# Patient Record
Sex: MALE | NOT HISPANIC OR LATINO | URBAN - METROPOLITAN AREA
[De-identification: names, ages, dates, MRNs, and addresses within clinical notes are randomized per-mention and may not be internally consistent; named-entity substitution may affect disease eponyms.]

---

## 2017-07-22 ENCOUNTER — IMPORTED ENCOUNTER (OUTPATIENT)
Dept: URBAN - METROPOLITAN AREA CLINIC 1 | Facility: CLINIC | Age: 62
End: 2017-07-22

## 2017-07-22 PROBLEM — Z96.1: Noted: 2017-07-22

## 2017-07-22 PROBLEM — Z79.84: Noted: 2017-07-22

## 2017-07-22 PROBLEM — H16.143: Noted: 2017-07-22

## 2017-07-22 PROBLEM — H04.123: Noted: 2017-07-22

## 2017-07-22 PROBLEM — H43.813: Noted: 2017-07-22

## 2017-07-22 PROBLEM — E11.9: Noted: 2017-07-22

## 2017-07-22 PROCEDURE — 92014 COMPRE OPH EXAM EST PT 1/>: CPT

## 2017-07-22 NOTE — PATIENT DISCUSSION
1.  DM Type II (Oral Meds) without sign of diabetic retinopathy and no blot heme on dilated retinal examination today OU No Macular Edema:  Discussed the pathophysiology of diabetes and its effect on the eye and risk of blindness. Stressed the importance of strong glucose control. Advised of importance of at least yearly dilated examinations but to contact us immediately for any problems or concerns. 2. ROJELIO w/ PEK OU- Increase ATs to TID OU routinely. 3.  PVD OU - RD precautions. 4.  Pseudophakia OU - (Standard OU) 5. Band Keratopathy OU- observe Letter to PCP Return for an appointment in 1 yr 27 with Dr. Claudeen Cobble.

## 2018-07-21 ENCOUNTER — IMPORTED ENCOUNTER (OUTPATIENT)
Dept: URBAN - METROPOLITAN AREA CLINIC 1 | Facility: CLINIC | Age: 63
End: 2018-07-21

## 2018-07-21 PROBLEM — Z79.84: Noted: 2018-07-21

## 2018-07-21 PROBLEM — H04.123: Noted: 2018-07-21

## 2018-07-21 PROBLEM — E11.9: Noted: 2018-07-21

## 2018-07-21 PROBLEM — Z79.4: Noted: 2018-07-21

## 2018-07-21 PROBLEM — H16.143: Noted: 2018-07-21

## 2018-07-21 PROCEDURE — 92015 DETERMINE REFRACTIVE STATE: CPT

## 2018-07-21 PROCEDURE — 92014 COMPRE OPH EXAM EST PT 1/>: CPT

## 2018-07-21 NOTE — PATIENT DISCUSSION
1.  DM Type II (Oral Meds/ Insulin) without sign of diabetic retinopathy and no blot heme on dilated retinal examination today OU No Macular Edema:  Discussed the pathophysiology of diabetes and its effect on the eye and risk of blindness. Stressed the importance of strong glucose control. Advised of importance of at least yearly dilated examinations but to contact us immediately for any problems or concerns. 2. ROJELIO w/ PEK OU- Increase ATs to BID-TID OU routinely. Sample of Systane Ultra given. 3.  PVD OU - RD precautions. 4.  Pseudophakia OU - (Standard OU) 5. Band Keratopathy OU- observe Letter to [address] for an appointment in 1 yr 27 with Dr. Sd Bagley.

## 2019-07-20 ENCOUNTER — IMPORTED ENCOUNTER (OUTPATIENT)
Dept: URBAN - METROPOLITAN AREA CLINIC 1 | Facility: CLINIC | Age: 64
End: 2019-07-20

## 2019-07-20 PROBLEM — H26.492: Noted: 2019-07-20

## 2019-07-20 PROBLEM — Z79.4: Noted: 2019-07-20

## 2019-07-20 PROBLEM — E11.9: Noted: 2019-07-20

## 2019-07-20 PROBLEM — Z96.1: Noted: 2019-07-20

## 2019-07-20 PROBLEM — H04.123: Noted: 2019-07-20

## 2019-07-20 PROBLEM — H16.143: Noted: 2019-07-20

## 2019-07-20 PROBLEM — H43.813: Noted: 2019-07-20

## 2019-07-20 PROCEDURE — 92015 DETERMINE REFRACTIVE STATE: CPT

## 2019-07-20 PROCEDURE — 92014 COMPRE OPH EXAM EST PT 1/>: CPT

## 2019-07-20 NOTE — PATIENT DISCUSSION
1.  DM Type II (Insulin) without sign of diabetic retinopathy and no blot heme on dilated retinal examination today OU No Macular Edema:  Discussed the pathophysiology of diabetes and its effect on the eye and risk of blindness. Stressed the importance of strong glucose control. Advised of importance of at least yearly dilated examinations but to contact us immediately for any problems or concerns. 2. ROJELIO w/ PEK OU- Recommend ATs TID OU routinely 3. PCO  OS: (Posterior Capsule Opacification)   Observe and consider yag cap when pt feels pco visually significant and visual acuity decreases to appropriate level. 4. Pseudophakia OU - (Standard OU) 5. Band Keratopathy OU- observe 6. PVD OU - RD precautions. MRX for glasses given. Return for an appointment in 1 year 30/glare with Dr. Mateus Powell.

## 2020-07-24 ENCOUNTER — IMPORTED ENCOUNTER (OUTPATIENT)
Dept: URBAN - METROPOLITAN AREA CLINIC 1 | Facility: CLINIC | Age: 65
End: 2020-07-24

## 2020-07-24 PROBLEM — H04.123: Noted: 2020-07-24

## 2020-07-24 PROBLEM — H26.493: Noted: 2020-07-24

## 2020-07-24 PROBLEM — Z96.1: Noted: 2020-07-24

## 2020-07-24 PROBLEM — H16.143: Noted: 2020-07-24

## 2020-07-24 PROBLEM — Z79.4: Noted: 2020-07-24

## 2020-07-24 PROBLEM — E11.9: Noted: 2020-07-24

## 2020-07-24 PROCEDURE — 92014 COMPRE OPH EXAM EST PT 1/>: CPT

## 2020-07-24 PROCEDURE — 92015 DETERMINE REFRACTIVE STATE: CPT

## 2020-07-24 NOTE — PATIENT DISCUSSION
1.  DM Type II (insulin) without sign of diabetic retinopathy and no blot heme on dilated retinal examination today OU No Macular Edema:  Discussed the pathophysiology of diabetes and its effect on the eye and risk of blindness. Stressed the importance of strong glucose control. Advised of importance of at least yearly dilated examinations but to contact us immediately for any problems or concerns. 2. ROJELIO w/ PEK OU-Recommend ATs TID OU routinely. 3. PCO OU: (Posterior Capsule Opacification)   Observe and consider yag cap when pt feels pco visually significant and visual acuity decreases to appropriate level. 4. Pseudophakia OU - (Standard OU)5. Band Keratopathy OU- observe 6. PVD OU - RD precautions. MRX for glasses given. Return for an appointment in 1 year 30/glare with Dr. Clari Bello.

## 2021-07-30 ENCOUNTER — IMPORTED ENCOUNTER (OUTPATIENT)
Dept: URBAN - METROPOLITAN AREA CLINIC 1 | Facility: CLINIC | Age: 66
End: 2021-07-30

## 2021-07-30 PROBLEM — H16.143: Noted: 2021-07-30

## 2021-07-30 PROBLEM — Z79.4: Noted: 2021-07-30

## 2021-07-30 PROBLEM — E11.9: Noted: 2021-07-30

## 2021-07-30 PROBLEM — H04.123: Noted: 2021-07-30

## 2021-07-30 PROBLEM — H26.493: Noted: 2021-07-30

## 2021-07-30 PROCEDURE — 92015 DETERMINE REFRACTIVE STATE: CPT

## 2021-07-30 PROCEDURE — 99214 OFFICE O/P EST MOD 30 MIN: CPT

## 2021-07-30 NOTE — PATIENT DISCUSSION
1.  DM Type II (insulin) without sign of diabetic retinopathy and no blot heme on dilated retinal examination today OU No Macular Edema:  Discussed the pathophysiology of diabetes and its effect on the eye and risk of blindness. Stressed the importance of strong glucose control. Advised of importance of at least yearly dilated examinations but to contact us immediately for any problems or concerns. 2. ROJELIO w/ PEK OU -- Recommend ATs TID OU routinely. 3. PCO OU (Posterior Capsule Opacification) -- Observe and consider yag cap when pt feels pco visually significant and visual acuity decreases to appropriate level. 4. Pseudophakia OU -- (Standard OU)5. Band Keratopathy OU -- stable observe 6. PVD OU -- RD precautions. MRX for glasses given. Return for an appointment in 1 year 27 with Dr. Master Perkins.

## 2022-03-19 PROBLEM — R53.1 WEAKNESS: Status: ACTIVE | Noted: 2017-07-08

## 2022-03-19 PROBLEM — E11.65 HYPERGLYCEMIA DUE TO TYPE 2 DIABETES MELLITUS (HCC): Status: ACTIVE | Noted: 2018-06-05

## 2022-03-31 PROBLEM — R03.0 FINDING OF ABOVE NORMAL BLOOD PRESSURE: Status: ACTIVE | Noted: 2022-03-31

## 2022-03-31 PROBLEM — I51.89 COMBINED SYSTOLIC AND DIASTOLIC CARDIAC DYSFUNCTION: Status: ACTIVE | Noted: 2022-03-31

## 2022-03-31 PROBLEM — F17.200 NICOTINE DEPENDENCE: Status: ACTIVE | Noted: 2022-03-31

## 2022-04-02 ASSESSMENT — TONOMETRY
OD_IOP_MMHG: 15
OD_IOP_MMHG: 15
OS_IOP_MMHG: 15
OD_IOP_MMHG: 13
OD_IOP_MMHG: 14
OS_IOP_MMHG: 13
OS_IOP_MMHG: 15
OS_IOP_MMHG: 16
OD_IOP_MMHG: 15
OS_IOP_MMHG: 14

## 2022-04-02 ASSESSMENT — VISUAL ACUITY
OD_CC: J1
OS_CC: J2
OD_SC: 20/25
OS_SC: 20/30
OD_SC: 20/20
OD_CC: J1
OS_GLARE: 20/60
OS_GLARE: 20/60
OD_SC: 20/20
OS_SC: 20/30
OS_SC: 20/30
OD_GLARE: 20/40
OD_SC: 20/20-1
OS_CC: J1
OS_SC: 20/40-2
OD_SC: 20/25
OS_SC: 20/30
OD_GLARE: 20/40

## 2022-08-15 PROBLEM — T82.9XXA AICD PROBLEM: Status: ACTIVE | Noted: 2022-08-15

## 2022-08-15 PROBLEM — Z95.810 AICD PROBLEM: Status: ACTIVE | Noted: 2022-08-15

## 2022-08-15 PROBLEM — R07.9 ACUTE CHEST PAIN: Status: ACTIVE | Noted: 2022-08-15

## 2022-08-16 PROBLEM — I47.20 VENTRICULAR TACHYARRHYTHMIA (HCC): Status: ACTIVE | Noted: 2022-08-16

## 2022-08-30 ENCOUNTER — COMPREHENSIVE EXAM (OUTPATIENT)
Dept: URBAN - METROPOLITAN AREA CLINIC 1 | Facility: CLINIC | Age: 67
End: 2022-08-30

## 2022-08-30 DIAGNOSIS — Z79.4: ICD-10-CM

## 2022-08-30 DIAGNOSIS — H16.143: ICD-10-CM

## 2022-08-30 DIAGNOSIS — H18.423: ICD-10-CM

## 2022-08-30 DIAGNOSIS — H26.493: ICD-10-CM

## 2022-08-30 DIAGNOSIS — E11.9: ICD-10-CM

## 2022-08-30 DIAGNOSIS — H04.123: ICD-10-CM

## 2022-08-30 DIAGNOSIS — Z96.1: ICD-10-CM

## 2022-08-30 PROCEDURE — 92015 DETERMINE REFRACTIVE STATE: CPT

## 2022-08-30 PROCEDURE — 99214 OFFICE O/P EST MOD 30 MIN: CPT

## 2022-08-30 ASSESSMENT — VISUAL ACUITY
OS_CC: 20/20
OS_BAT: 20/60
OD_CC: 20/20
OD_BAT: 20/60

## 2022-08-30 ASSESSMENT — TONOMETRY
OS_IOP_MMHG: 14
OD_IOP_MMHG: 14

## 2023-08-28 ENCOUNTER — COMPREHENSIVE EXAM (OUTPATIENT)
Dept: URBAN - METROPOLITAN AREA CLINIC 1 | Facility: CLINIC | Age: 68
End: 2023-08-28

## 2023-08-28 DIAGNOSIS — Z96.1: ICD-10-CM

## 2023-08-28 DIAGNOSIS — H16.143: ICD-10-CM

## 2023-08-28 DIAGNOSIS — H26.493: ICD-10-CM

## 2023-08-28 DIAGNOSIS — E11.9: ICD-10-CM

## 2023-08-28 DIAGNOSIS — H04.123: ICD-10-CM

## 2023-08-28 DIAGNOSIS — Z79.4: ICD-10-CM

## 2023-08-28 DIAGNOSIS — H43.813: ICD-10-CM

## 2023-08-28 DIAGNOSIS — H18.423: ICD-10-CM

## 2023-08-28 PROCEDURE — 99214 OFFICE O/P EST MOD 30 MIN: CPT

## 2023-08-28 PROCEDURE — 92015 DETERMINE REFRACTIVE STATE: CPT

## 2023-08-28 ASSESSMENT — VISUAL ACUITY
OD_CC: J1
OS_PH: 20/25
OD_CC: 20/25
OS_CC: J1
OS_CC: 20/30

## 2023-08-28 ASSESSMENT — TONOMETRY
OD_IOP_MMHG: 15
OS_IOP_MMHG: 15